# Patient Record
Sex: MALE | Race: WHITE
[De-identification: names, ages, dates, MRNs, and addresses within clinical notes are randomized per-mention and may not be internally consistent; named-entity substitution may affect disease eponyms.]

---

## 2017-04-07 ENCOUNTER — HOSPITAL ENCOUNTER (EMERGENCY)
Dept: HOSPITAL 74 - FER | Age: 27
Discharge: HOME | End: 2017-04-07
Payer: COMMERCIAL

## 2017-04-07 VITALS — DIASTOLIC BLOOD PRESSURE: 78 MMHG | HEART RATE: 82 BPM | TEMPERATURE: 97.5 F | SYSTOLIC BLOOD PRESSURE: 137 MMHG

## 2017-04-07 VITALS — BODY MASS INDEX: 21.2 KG/M2

## 2017-04-07 DIAGNOSIS — I86.1: Primary | ICD-10-CM

## 2017-04-07 DIAGNOSIS — Z87.828: ICD-10-CM

## 2017-04-07 PROCEDURE — 3E0233Z INTRODUCTION OF ANTI-INFLAMMATORY INTO MUSCLE, PERCUTANEOUS APPROACH: ICD-10-PCS
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
## 2017-04-09 ENCOUNTER — HOSPITAL ENCOUNTER (EMERGENCY)
Dept: HOSPITAL 74 - FER | Age: 27
Discharge: HOME | End: 2017-04-09
Payer: COMMERCIAL

## 2017-04-09 VITALS — TEMPERATURE: 98.1 F | DIASTOLIC BLOOD PRESSURE: 81 MMHG | SYSTOLIC BLOOD PRESSURE: 141 MMHG | HEART RATE: 82 BPM

## 2017-04-09 VITALS — BODY MASS INDEX: 21.2 KG/M2

## 2017-04-09 DIAGNOSIS — N50.812: Primary | ICD-10-CM

## 2017-04-09 LAB
COLOR UR: YELLOW
PH UR: 5.5 [PH] (ref 4.5–8)
SP GR UR: 1.01 (ref 1–1.02)
UROBILINOGEN UR STRIP-MCNC: (no result) MG/DL (ref 0.2–1)

## 2017-04-09 NOTE — PDOC
History of Present Illness





- General


History Source: Patient


Exam Limitations: No Limitations





- History of Present Illness


Initial Comments: 





04/09/17 16:18


The patient is a 26 year old male, with no significant past medical history, 

who presents to the emergency department with acute testicular pain. The 

patient was seen in the emergency department 2 days ago for the same symptoms. 

He reports that he had an injury many years ago and had a varicocelectomy in 

November and has never felt the same since. He states that he saw his urologist 

on Friday and he gave him a shot. 





The patient denies discharge,dysuria, fever, chills, abdominal pain, and back 

pain.


He denies any other medical issues.  





Allergies: None


Past surgical history: Varicocelectomy (November 2016)





<Penny Solis - Last Filed: 04/09/17 18:24>





<Michael Lyon - Last Filed: 04/09/17 18:27>





- General


Chief Complaint: Pain


Stated Complaint: TESTICLE PAIN


Time Seen by Provider: 04/09/17 15:44





Past History





<Penny Solis - Last Filed: 04/09/17 18:24>





- Past Medical History


 Disorders: Yes (CHRONIC TESTICULAR PAIN)





- Psycho/Social/Smoking Cessation Hx


Anxiety: No


Suicidal Ideation: No


Smoking History: Never smoked





<Michael Lyon - Last Filed: 04/09/17 18:27>





- Past Medical History


Allergies/Adverse Reactions: 


 Allergies











Allergy/AdvReac Type Severity Reaction Status Date / Time


 


No Known Allergies Allergy   Verified 04/09/17 15:40











Home Medications: 


Ambulatory Orders





Ketorolac Tromethamine [Toradol] 10 mg PO Q6H PRN #20 tablet 04/07/17 


Ciprofloxacin HCl [Cipro] 500 mg PO BID #14 tablet 04/09/17 


Oxycodone HCl/Acetaminophen [Percocet 5-325 mg Tablet] 1 tab PO Q4H #10 tablet 

MDD 6 04/09/17 











**Review of Systems





- Review of Systems


Able to Perform ROS?: Yes


Comments:: 





04/09/17 16:18


Able to Perform ROS?: Yes








CONSTITUTIONAL: Yes: Symptoms Reported, See HPI, Night Sweats.  No: Chills, 

Diaphoresis, Fever, Loss of Appetite, Malaise, Weakness, Weight Stable, 

Unintentional Wgt. Loss, Unexplained wgt Loss, Other








HEENTM: Yes: Symptoms Reported, See HPI.  No: Eye Pain, Blurred Vision, Tearing

, Recent change in vision, Double Vision, Cataracts, Ear Pain, Ocular Prothesis

, Ear Discharge, Nose Pain, Nose Congestion, Tinnitus, Nose Bleeding, Hearing 

Loss, Throat Pain, Throat Swelling, Mouth Pain, Dental Problems, Difficulty 

Swallowing, Mouth Swelling, Other








RESPIRATORY: Yes: Symptoms reported, See HPI.  No: Cough, Orthopnea, Shortness 

of Breath, SOB with Exertion, SOB at Rest, Stridor, Wheezing, Productive cough, 

Hemoptysis, Other








CARDIAC (ROS): Yes: Symptoms Reported, See HPI.  No: Chest Pain, Edema, 

Irregular Heart Rate, Lightheadedness, Palpitations, Syncope, Chest Tightness, 

Other








ABD/GI: Yes: Symptoms Reported, See HPI.  No: Abdominal Distended, Abd. Pain w/ 

defecation, Blood Streaked Bowels, Constipated, Diarrhea, Difficulty Swallowing

, Nausea, Poor Appetite, Poor Fluid Intake, Rectal Bleeding, Vomiting, 

Indigestion, Abdominal cramping, Tarry Stools, Other








: Yes: Symptoms Reported, See HPI








MUSCULOSKELETAL: Yes: Symptoms Reported, See HPI.  No: Back Pain, Gout, Joint 

Pain, Joint Swelling, Muscle Pain, Muscle Weakness, Neck Pain, Joint Stiffness, 

Other








INTEGUMENTARY: Yes: Symptoms Reported, See HPI.  No: Bruising, Change in Color, 

Change in Hair/Nails, Dryness, Erythema, Flushing, Lesions, Lumps, Pallor, 

Pruritus, Rash, Sweating, Other





TESTICULAR: +Scrotal pain. 








NEUROLGOICAL: Yes: Symptoms reported, See HPI.  No: Headache, Numbness, 

Paresthesia, Pre-Existing Deficit, Seizure, Tingling, Tremors, Weakness, 

Unsteady Gait, Ataxia, Dizziness, Other








All Other Systems: Reviewed and Negative








<Penny Solis - Last Filed: 04/09/17 18:24>





*Physical Exam





- Physical Exam


Comments: 


04/09/17 16:19





Physical Exam


GENERAL APPEARANCE: Yes: Appropriately Dressed, Nourished.  No: Apparent 

Distress, Disheveled, Mild Distress, Moderate Distress, Severe Distress, 

Alcohol on Breath, Intoxicated, Cachetic, Obese, Thin, Other








HEENT: positive: EOMI, RUDOLPH, Normal ENT Inspection, Normal Voice, TMs Normal, 

Pharynx Normal.  negative: Symmetrical, Pale Conjunctivae, Photophobia, Scleral 

Icterus (R), Scleral Icterus (L), Muffled/Hoarse voice, Pharyngeal Erythema, 

Tonsillar Exudate, Tonsillar Erythema, Nasal Congestion, Rhinorrhea, Sinus 

Tenderness, Orbits, Hearing Decreased, Hearing Grossly Normal, TM Bulging, TM 

Dull, TM Erythema, Lesions, Marks, Excessive drooling, Thrush, Other








NECK: positive: Trachea midline, Normal Thyroid, Supple.  negative: Tender, 

Rigid, Carotid bruit, Decreased range of motion, Stridor, Lymphadenopathy (R), 

Lymphadenopathy (L), Rigidity, Tender lateral, Tender midline, Thyromegaly, 

Other








RESPIRATORY/CHEST: positive: Lungs Clear, Normal Breath Sounds.  negative: 

Accessory Muscle Use, Chest Tender, Respiratory Distress, Labored Respiration, 

Rapid RR, Decreased Breath Sounds, Paradoxal Breathing, Crackles, Rales, Rhonchi

, Stridor, Wheezing, Dullness, Hyperresonant, Plerual Rub, Other








CARDIOVASCULAR: positive: Regular Rate, Regular Rhythm, S1, S2.  negative: Edema

, JVD, Murmur, Bradycardia, Tachycardia, Diastolic Murmur, Systolic Murmur, 

Gallop/S3, Gallop/S4, Irregularly Irregular, Irregular, Other








VASCULAR PULSES: Femoral (R): 4+, Femoral (L): 4+, Carotid (R): 4+, Carotid (L)

: 4+, Dorsalis-Pedis (R): 4+, Doralis-Pedis (L): 4+


Gastrointestinal/Abdominal: positive: Normal Bowel Sounds, Flat, Soft.  negative

: Tender, Organomegaly, Pulsatile Mass, Increased Bowel Sounds, Decreased BS, 

Protuberent, Distended, Guarding, Rebound, Tenderness, Hernia, Mass, 

Hepatomegaly, Spleenomegaly, Other





TESTICULAR: Normal exam. No testicular torsion. No epididymal pain. No redness, 

no swelling, no tenderness. 








LYMPHATIC: negative: Adenopathy, Tenderness, Other








MUSCULOSKELETAL: positive: Normal Inspection.  negative: CVA Tenderness, CVA 

Tenderness (R), CVA Tenderness (L), Decreased Range of Motion, Muscle Spasm, 

Vertebral Tenderness, Other








EXTREMITY: positive: Normal Capillary Refill, Normal Inspection, Normal Range 

of Motion.  negative: Tender, Pelvis Stable, Coldness, Cyanosis, Delayed 

Capillary Refill, Pedal Edema, Swelling, Calf Tenderness, Erythema, Inflammation

, Other








INTEGUMENTARY: positive: Normal Color, Dry, Warm.  negative: Cyanotic, Erythema

, Jaundice, Mottled, Pale, Cold, Clammy, Diaphoresis, Moist, Hives, Petechiae, 

Rash, Swelling, Ecchymosis, Bruising, Other








NEUROLOGIC: positive: CNs II-XII NML intact, Fully Oriented, Alert, Normal Mood/

Affect, Normal Response, Motor Strength 5/5.  negative: Abnormal Cranial NS, 

Respond to painful stimul, Responsive, EOM Palsy, Facial Droop, Numbness, 

Sensory Deficit, Finger to Nose, Confused, Disoriented, Depressed Affect, 

Babinski, Other














<Penny Solis - Last Filed: 04/09/17 18:24>





ED Treatment Course





- RADIOLOGY


Radiograph Interpretation: 





04/09/17 18:24


 Referring Physician: Michael Lyon 


Patient Name: Gabriele Christian 


DATE OF SERVICE: 2017-04-09 16:35:07.0  IMAGES: 56 


EXAM: SCROTAL ULTRASOUND WITH DOPPLER:  


REASON FOR EXAM: bilateral pain.left >right  


COMPARISON: 4/7/2017  


FINDINGS: The testicles are normal in echotexture with normal and symmetric 

arterial and venous testicular vascularity. There is no solid or cystic 

testicular lesion. Small right hydrocele noted. There is no varicocele. 

Epididymal vascularity symmetric.  The right testicle measures 4.5 x 2.2 x 3 

cm. The epididymus measures 1.3 cm with tiny 2 x 2 x 7 mm cyst  The left 

testicle measures 4.4 x 2.5 x 3.5 cm. The epididymus measures 0.7 cm.  


IMPRESSION: No evidence of testicular torsion, orchitis or epididymitis. Tiny 

right epididymal cyst. 


Traci Whitfield D.O. 04/09/2017 17:44 EST MDANIELLE 








<Penny Solis - Last Filed: 04/09/17 18:24>





Progress Note





- Progress Note


Progress Note: 


Pt is feeling better after pain medication given


Will treat for orchitis 2 days ago and will be given pain medication


Needs to follow up with Urologist





If worsen return to ER


Pt in agreement with plan





<Michael Lyon - Last Filed: 04/09/17 18:27>





*DC/Admit/Observation/Transfer





- Attestations


Scribe Attestion: 





04/09/17 16:19





Documentation prepared by JESUS Vincent, acting as medical scribe for 

Michael Lyon MD.





<Penny Solis - Last Filed: 04/09/17 18:24>





- Discharge Dispostion


Admit: No





<Michael Lyon - Last Filed: 04/09/17 18:27>


Diagnosis at time of Disposition: 


 Pain in left testicle





- Discharge Dispostion


Disposition: HOME


Condition at time of disposition: Good





- Prescriptions


Prescriptions: 


Ciprofloxacin HCl [Cipro] 500 mg PO BID #14 tablet


Oxycodone HCl/Acetaminophen [Percocet 5-325 mg Tablet] 1 tab PO Q4H #10 tablet 

MDD 6





- Patient Instructions


Printed Discharge Instructions:  DI for Testicular Pain


Additional Instructions: 


Continue Toradol


Ice, Tylenol, rest


Cipro 500mg 2x/day for 7 days


Percocet 5/325 mg 1 tab every 4-6 hr for pain


If worsen return to ER





Follow up with your Urologist

## 2017-08-02 ENCOUNTER — OUTPATIENT (OUTPATIENT)
Dept: OUTPATIENT SERVICES | Facility: HOSPITAL | Age: 27
LOS: 1 days | End: 2017-08-02

## 2017-09-17 ENCOUNTER — HOSPITAL ENCOUNTER (EMERGENCY)
Dept: HOSPITAL 74 - FER | Age: 27
Discharge: HOME | End: 2017-09-17
Payer: COMMERCIAL

## 2017-09-17 VITALS — TEMPERATURE: 98.4 F | SYSTOLIC BLOOD PRESSURE: 134 MMHG | HEART RATE: 90 BPM | DIASTOLIC BLOOD PRESSURE: 75 MMHG

## 2017-09-17 VITALS — BODY MASS INDEX: 28.1 KG/M2

## 2017-09-17 DIAGNOSIS — N50.812: Primary | ICD-10-CM

## 2017-09-17 PROCEDURE — 3E0233Z INTRODUCTION OF ANTI-INFLAMMATORY INTO MUSCLE, PERCUTANEOUS APPROACH: ICD-10-PCS
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
## 2017-09-19 ENCOUNTER — HOSPITAL ENCOUNTER (EMERGENCY)
Dept: HOSPITAL 74 - FER | Age: 27
LOS: 1 days | Discharge: HOME | End: 2017-09-20
Payer: COMMERCIAL

## 2017-09-19 VITALS — TEMPERATURE: 99.5 F

## 2017-09-19 VITALS — BODY MASS INDEX: 21.2 KG/M2

## 2017-09-19 DIAGNOSIS — F41.0: ICD-10-CM

## 2017-09-19 DIAGNOSIS — N50.812: Primary | ICD-10-CM

## 2017-09-19 LAB
COLOR UR: YELLOW
LEUKOCYTE ESTERASE UR QL STRIP: NEGATIVE
PH UR: 7.5 [PH] (ref 4.5–8)
SP GR UR: 1.01 (ref 1–1.02)
UROBILINOGEN UR STRIP-MCNC: 0.2 MG/DL (ref 0.2–1)

## 2017-09-19 PROCEDURE — 3E0233Z INTRODUCTION OF ANTI-INFLAMMATORY INTO MUSCLE, PERCUTANEOUS APPROACH: ICD-10-PCS
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
## 2017-09-20 VITALS — DIASTOLIC BLOOD PRESSURE: 70 MMHG | HEART RATE: 95 BPM | SYSTOLIC BLOOD PRESSURE: 120 MMHG

## 2018-02-20 ENCOUNTER — HOSPITAL ENCOUNTER (EMERGENCY)
Dept: HOSPITAL 74 - JER | Age: 28
Discharge: HOME | End: 2018-02-20
Payer: COMMERCIAL

## 2018-02-20 VITALS — BODY MASS INDEX: 21.2 KG/M2

## 2018-02-20 VITALS — TEMPERATURE: 99.9 F

## 2018-02-20 VITALS — HEART RATE: 116 BPM | DIASTOLIC BLOOD PRESSURE: 64 MMHG | SYSTOLIC BLOOD PRESSURE: 124 MMHG

## 2018-02-20 DIAGNOSIS — S60.552A: Primary | ICD-10-CM
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
## 2018-02-20 DIAGNOSIS — S60.551A: ICD-10-CM
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
## 2018-02-20 PROCEDURE — 0JCD0ZZ EXTIRPATION OF MATTER FROM RIGHT UPPER ARM SUBCUTANEOUS TISSUE AND FASCIA, OPEN APPROACH: ICD-10-PCS

## 2018-02-20 PROCEDURE — 0JCF0ZZ EXTIRPATION OF MATTER FROM LEFT UPPER ARM SUBCUTANEOUS TISSUE AND FASCIA, OPEN APPROACH: ICD-10-PCS
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
## 2019-01-09 ENCOUNTER — HOSPITAL ENCOUNTER (EMERGENCY)
Dept: HOSPITAL 74 - FER | Age: 29
LOS: 1 days | Discharge: HOME | End: 2019-01-10
Payer: COMMERCIAL

## 2019-01-09 VITALS — BODY MASS INDEX: 22.8 KG/M2

## 2019-01-09 DIAGNOSIS — G89.29: ICD-10-CM

## 2019-01-09 DIAGNOSIS — N50.811: ICD-10-CM

## 2019-01-09 DIAGNOSIS — N45.1: Primary | ICD-10-CM

## 2019-01-10 VITALS — SYSTOLIC BLOOD PRESSURE: 118 MMHG | HEART RATE: 96 BPM | DIASTOLIC BLOOD PRESSURE: 88 MMHG | TEMPERATURE: 98.3 F

## 2019-01-13 ENCOUNTER — HOSPITAL ENCOUNTER (EMERGENCY)
Dept: HOSPITAL 74 - FER | Age: 29
Discharge: LEFT BEFORE DISCHARGE | End: 2019-01-13
Payer: COMMERCIAL

## 2019-01-13 VITALS — SYSTOLIC BLOOD PRESSURE: 126 MMHG | HEART RATE: 82 BPM | TEMPERATURE: 98.5 F | DIASTOLIC BLOOD PRESSURE: 94 MMHG

## 2019-01-13 VITALS — BODY MASS INDEX: 22.8 KG/M2

## 2019-01-13 DIAGNOSIS — N50.819: Primary | ICD-10-CM

## 2019-01-13 NOTE — PDOC
History of Present Illness





- General


Chief Complaint: Pain, Acute


Stated Complaint: CHRONIC TESTICULAR PAIN


Time Seen by Provider: 01/13/19 19:34


History Source: Patient


Exam Limitations: Other (History was a challenge as the mother refused to leave 

the room and the continually interrupted and provided most of the answers and 

commentary when the patient would answer.)





- History of Present Illness


Initial Comments: 





01/13/19 20:01


This is a 28-year-old male brought in by his mother for evaluation of 

testicular pain.  He has approximately 10 year history of chronic testicular 

pain for which she has seen multiple urologists and says that they haven't been 

able to help him. As per his mother the only thing that helps his pain is 

opioids. Patient was here 3 days again and given a small prescription for 

opioids a total of 6 Percocet. In addition to that he had a full workup 

including urinalysis and ultrasound that did show possible epididymitis for 

which she was also given a short course of ciprofloxacin. Patient was given 

referral to a urologist and as per his mother he does have an appointment for 

Thursday of next week.  Patient said he has also been to a pain management 

person for management of his pain but the pain management person did not help 

him and he has not tried to see anybody else. Patient denies any fevers, chills

, nausea, vomiting, diarrhea. Patient denies any other complaints. Patient said 

that the pain is unchanged times many years now. Patient said he has taken anti-

inflammatories in the past without relief. Throughout the evaluation the patient

's mother continually interrupted and demanded that he be given opioids.  When 

I told her that I would not prescribe opioids for his pain as he had already 

been given opioids and with the current opioid crisis I felt that his complaint 

did not warrant opioid management. I suggested that if he needed opioids they 

should, from a pain specialist or at the very least his urologist. I did say I 

wanted to do a basic workup including a repeat ultrasound to make sure that his 

symptoms had resolved and there was no other reason for his pain. As soon as I 

told him I would not give him opioids his mother became very upset and angry 

and refused any further evaluation told him to get dressed and demanded that he 

leave the emergency department with her.


Patient eloped from the emergency department with his mother.





Patient's mother refused to let me do an exam or any further workup. 





Past History





- Past Medical History


Allergies/Adverse Reactions: 


 Allergies











Allergy/AdvReac Type Severity Reaction Status Date / Time


 


No Known Allergies Allergy   Verified 01/09/19 23:47











Home Medications: 


Ambulatory Orders





Amitriptyline HCl [Elavil -] 10 mg PO HS 02/20/18 


Olanzapine [Zyprexa] 10 mg PO DAILY 02/20/18 


Ciprofloxacin [Cipro -] 500 mg PO Q12H #6 tablet 01/10/19 


Oxycodone HCl/Acetaminophen [Percocet 5-325 mg Tablet] 1 tab PO Q6H PRN #6 

tablet MDD 3 01/10/19 








COPD: No


 Disorders: Yes (CHRONIC TESTICULAR PAIN)


Other medical history: CHRONIC TESTICULAR PAIN





- Immunization History


Immunization Up to Date: Yes





- Suicide/Smoking/Psychosocial Hx


Smoking History: Never smoked


Have you smoked in the past 12 months: No


Information on smoking cessation initiated: No


Hx Alcohol Use: No


Drug/Substance Use Hx: Yes (WEED DAILY)





*Physical Exam





- Vital Signs


 Last Vital Signs











Temp Pulse Resp BP Pulse Ox


 


 98.5 F   82   16   126/94   100 


 


 01/13/19 19:38  01/13/19 19:38  01/13/19 19:38  01/13/19 19:38  01/13/19 19:38














Moderate Sedation





- Procedure Monitoring


Vital Signs: 


Procedure Monitoring Vital Signs











Temperature  98.5 F   01/13/19 19:38


 


Pulse Rate  82   01/13/19 19:38


 


Respiratory Rate  16   01/13/19 19:38


 


Blood Pressure  126/94   01/13/19 19:38


 


O2 Sat by Pulse Oximetry (%)  100   01/13/19 19:38











*DC/Admit/Observation/Transfer


Diagnosis at time of Disposition: 


 Chronic pain in testicle








- Discharge Dispostion


Disposition: ELOPED


Condition at time of disposition: Stable





- Referrals


Referrals: 


Sy Smallwood MD [Primary Care Provider] - 





- Patient Instructions





- Post Discharge Activity

## 2019-01-16 PROBLEM — Z00.00 ENCOUNTER FOR PREVENTIVE HEALTH EXAMINATION: Status: ACTIVE | Noted: 2019-01-16

## 2019-01-17 ENCOUNTER — APPOINTMENT (OUTPATIENT)
Dept: FAMILY MEDICINE | Facility: CLINIC | Age: 29
End: 2019-01-17

## 2019-02-14 ENCOUNTER — HOSPITAL ENCOUNTER (EMERGENCY)
Dept: HOSPITAL 74 - FER | Age: 29
Discharge: HOME | End: 2019-02-14
Payer: COMMERCIAL

## 2019-02-14 VITALS — BODY MASS INDEX: 21.2 KG/M2

## 2019-02-14 VITALS — SYSTOLIC BLOOD PRESSURE: 125 MMHG | DIASTOLIC BLOOD PRESSURE: 73 MMHG | HEART RATE: 99 BPM

## 2019-02-14 VITALS — TEMPERATURE: 97.6 F

## 2019-02-14 DIAGNOSIS — N50.811: Primary | ICD-10-CM

## 2019-02-14 DIAGNOSIS — F41.0: ICD-10-CM

## 2019-02-14 LAB
ALBUMIN SERPL-MCNC: 4.6 G/DL (ref 3.4–5)
ALP SERPL-CCNC: 45 U/L (ref 45–117)
ALT SERPL-CCNC: 13 U/L (ref 13–61)
AMPHET UR-MCNC: NEGATIVE NG/ML
ANION GAP SERPL CALC-SCNC: 12 MMOL/L (ref 8–16)
APPEARANCE UR: (no result)
AST SERPL-CCNC: 18 U/L (ref 15–37)
BARBITURATES UR-MCNC: NEGATIVE NG/ML
BASOPHILS # BLD: 0.6 % (ref 0–2)
BENZODIAZ UR SCN-MCNC: NEGATIVE NG/ML
BILIRUB SERPL-MCNC: 1.1 MG/DL (ref 0.2–1)
BILIRUB UR STRIP.AUTO-MCNC: (no result) MG/DL
BUN SERPL-MCNC: 17 MG/DL (ref 7–18)
CALCIUM SERPL-MCNC: 9.4 MG/DL (ref 8.5–10)
CHLORIDE SERPL-SCNC: 104 MMOL/L (ref 98–107)
CO2 SERPL-SCNC: 22 MMOL/L (ref 21–32)
COCAINE UR-MCNC: NEGATIVE NG/ML
COLOR UR: (no result)
CREAT SERPL-MCNC: 1 MG/DL (ref 0.55–1.3)
DEPRECATED RDW RBC AUTO: 12.8 % (ref 11.9–15.9)
EOSINOPHIL # BLD: 4 % (ref 0–4.5)
GLUCOSE SERPL-MCNC: 135 MG/DL (ref 74–106)
HCT VFR BLD CALC: 43 % (ref 35.4–49)
HGB BLD-MCNC: 14.2 GM/DL (ref 11.7–16.9)
KETONES UR QL STRIP: (no result)
LYMPHOCYTES # BLD: 37.5 % (ref 8–40)
MCH RBC QN AUTO: 27.6 PG (ref 25.7–33.7)
MCHC RBC AUTO-ENTMCNC: 32.9 G/DL (ref 32–35.9)
MCV RBC: 83.8 FL (ref 80–96)
METHADONE UR-MCNC: NEGATIVE NG/ML
MONOCYTES # BLD AUTO: 7.5 % (ref 3.8–10.2)
NEUTROPHILS # BLD: 50.4 % (ref 42.8–82.8)
OPIATES UR QL SCN: NEGATIVE NG/ML
PCP UR QL SCN: NEGATIVE NG/ML
PH UR: 6 [PH] (ref 4.5–8)
PLATELET # BLD AUTO: 218 K/MM3 (ref 134–434)
PMV BLD: 7.9 FL (ref 7.5–11.1)
POTASSIUM SERPLBLD-SCNC: 3.5 MMOL/L (ref 3.5–5.1)
PROT SERPL-MCNC: 7.6 G/DL (ref 6.4–8.2)
RBC # BLD AUTO: 5.13 M/MM3 (ref 4–5.6)
SODIUM SERPL-SCNC: 138 MMOL/L (ref 136–145)
SP GR UR: >= 1.03 (ref 1.01–1.03)
UROBILINOGEN UR STRIP-MCNC: 0.2 MG/DL (ref 0.2–1)
WBC # BLD AUTO: 4.3 K/MM3 (ref 4–10.8)

## 2019-02-14 PROCEDURE — 3E0233Z INTRODUCTION OF ANTI-INFLAMMATORY INTO MUSCLE, PERCUTANEOUS APPROACH: ICD-10-PCS | Performed by: EMERGENCY MEDICINE

## 2019-02-14 PROCEDURE — 3E023NZ INTRODUCTION OF ANALGESICS, HYPNOTICS, SEDATIVES INTO MUSCLE, PERCUTANEOUS APPROACH: ICD-10-PCS | Performed by: EMERGENCY MEDICINE
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
## 2019-02-17 ENCOUNTER — HOSPITAL ENCOUNTER (EMERGENCY)
Dept: HOSPITAL 74 - FER | Age: 29
Discharge: LEFT BEFORE DISCHARGE | End: 2019-02-17
Payer: COMMERCIAL

## 2019-02-17 VITALS — HEART RATE: 88 BPM | SYSTOLIC BLOOD PRESSURE: 105 MMHG | DIASTOLIC BLOOD PRESSURE: 74 MMHG

## 2019-02-17 VITALS — BODY MASS INDEX: 21.2 KG/M2

## 2019-02-17 VITALS — TEMPERATURE: 99.6 F

## 2019-02-17 DIAGNOSIS — R45.851: Primary | ICD-10-CM

## 2019-02-17 DIAGNOSIS — G89.29: ICD-10-CM

## 2019-02-17 DIAGNOSIS — N50.819: ICD-10-CM

## 2019-02-17 LAB
ALBUMIN SERPL-MCNC: 4.9 G/DL (ref 3.4–5)
ALP SERPL-CCNC: 46 U/L (ref 45–117)
ALT SERPL-CCNC: 12 U/L (ref 13–61)
ANION GAP SERPL CALC-SCNC: 12 MMOL/L (ref 8–16)
AST SERPL-CCNC: 18 U/L (ref 15–37)
BASOPHILS # BLD: 0.4 % (ref 0–2)
BILIRUB SERPL-MCNC: 1 MG/DL (ref 0.2–1)
BUN SERPL-MCNC: 14 MG/DL (ref 7–18)
CALCIUM SERPL-MCNC: 10 MG/DL (ref 8.5–10)
CHLORIDE SERPL-SCNC: 103 MMOL/L (ref 98–107)
CO2 SERPL-SCNC: 23 MMOL/L (ref 21–32)
CREAT SERPL-MCNC: 0.9 MG/DL (ref 0.55–1.3)
DEPRECATED RDW RBC AUTO: 13.4 % (ref 11.9–15.9)
EOSINOPHIL # BLD: 0.4 % (ref 0–4.5)
GLUCOSE SERPL-MCNC: 98 MG/DL (ref 74–106)
HCT VFR BLD CALC: 45.3 % (ref 35.4–49)
HGB BLD-MCNC: 14.7 GM/DL (ref 11.7–16.9)
INR BLD: 1.16 (ref 0.82–1.09)
LYMPHOCYTES # BLD: 9.5 % (ref 8–40)
MCH RBC QN AUTO: 27.2 PG (ref 25.7–33.7)
MCHC RBC AUTO-ENTMCNC: 32.5 G/DL (ref 32–35.9)
MCV RBC: 83.7 FL (ref 80–96)
MONOCYTES # BLD AUTO: 4.7 % (ref 3.8–10.2)
NEUTROPHILS # BLD: 85 % (ref 42.8–82.8)
PLATELET # BLD AUTO: 240 K/MM3 (ref 134–434)
PMV BLD: 7.9 FL (ref 7.5–11.1)
POTASSIUM SERPLBLD-SCNC: 4 MMOL/L (ref 3.5–5.1)
PROT SERPL-MCNC: 7.8 G/DL (ref 6.4–8.2)
PT PNL PPP: 12.9 SEC (ref 10.2–13)
RBC # BLD AUTO: 5.42 M/MM3 (ref 4–5.6)
SODIUM SERPL-SCNC: 138 MMOL/L (ref 136–145)
WBC # BLD AUTO: 9.3 K/MM3 (ref 4–10.8)
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
## 2019-02-17 NOTE — PDOC
History of Present Illness





- General


Chief Complaint: Suicidal


Stated Complaint: i am suicidal


Time Seen by Provider: 02/17/19 16:17


History Source: Patient


Exam Limitations: No Limitations





- History of Present Illness


Initial Comments: 








29 yo M w a hx of chronic scrotal pain and anxiety presents to the ER stating 

he is in too much pain and wants to kill himself. He states he experienced 

testicular trauma when he was 18 which required a varicocelectomy. Since his 

surgery he has experienced "relentless pain" which will not subside no matter 

what he takes. He has seen many urologists for this who have said there is no 

medical problem with his testicle. They have referred him to a pain management 

doctor who has given him gabapentin for the pain but the patient reports no 

relief from the gabapentin. Today he states his pain is 10/10 and excruciating 

and he says that he wants to kill himself because of the pain. He has been 

thinking about killing himself for 6 months and at first it was rare for him to 

think this but now the thoughts have become recurrent and are happening every 

day most of the day. He says his plan is to take as many pills of whatever 

medication will kill him so that he does not have to experience this pain on a 

daily basis. He states he has seen a psychiatrist in the past but does not know 

his name and has not been given a psychiatric diagnosis. His mother who was 

with him at bedside reports that he has been throwing things around the home 

and acting very violently lately stating he is going to kill himself. 





- The mother who was originally with him at bedside states he cannot come home 

because she cannot take care of him. He has been repeating Non-Stop that he 

wants to and is going to kill himself by overdosing on pills. 





FAMILY MEDICAL HISTORY: Mom has schizophrenia, dad has a mental illness - 

patient is not sure the diagnosis. 





PCP: Sy Smallwood


PSH: Right Varicocelectomy


Allergies: NKA, NKDA


Social Hx: Smokes marijuana




















Past History





- Past Medical History


Allergies/Adverse Reactions: 


 Allergies











Allergy/AdvReac Type Severity Reaction Status Date / Time


 


No Known Allergies Allergy   Verified 02/14/19 09:00











Home Medications: 


Ambulatory Orders





Gabapentin 300 mg PO TID 02/14/19 








COPD: No


 Disorders: Yes (CHRONIC TESTICULAR PAIN)





- Suicide/Smoking/Psychosocial Hx


Smoking History: Never smoked


Have you smoked in the past 12 months: No


Hx Alcohol Use: No


Drug/Substance Use Hx: Yes (weed)


Substance Use Type: None





**Review of Systems





- Review of Systems


Able to Perform ROS?: Yes


Comments:: 





CONSTITUTIONAL: 


Absent: fever, chills, diaphoresis, generalized weakness, malaise, loss of 

appetite


HEENT: 


Absent: rhinorrhea, nasal congestion, throat pain, throat swelling, difficulty 

swallowing, mouth swelling, ear pain, eye pain, visual Changes


CARDIOVASCULAR: 


Absent: chest pain, syncope, palpitations, irregular heart rate, lightheadedness

, peripheral edema


RESPIRATORY: 


Absent: cough, shortness of breath, dyspnea with exertion, orthopnea, wheezing, 

stridor, hemoptysis


GASTROINTESTINAL:


Absent: abdominal pain, abdominal distension, nausea, vomiting, diarrhea, 

constipation, melena, hematochezia


GENITOURINARY: 


Present: Genital pain


Absent: dysuria, frequency, urgency, hesitancy, hematuria, flank pain. 


MUSCULOSKELETAL: 


Absent: myalgia, arthralgia, joint swelling


SKIN: 


Absent: rash, itching, pallor


HEMATOLOGIC/IMMUNOLOGIC: 


Absent: easy bleeding, easy bruising, lymphadenopathy, frequent infections


ENDOCRINE:


Absent: unexplained weight gain, unexplained weight loss, heat intolerance, 

cold intolerance


NEUROLOGIC: 


Absent: headache, focal weakness or paresthesias, dizziness, unsteady gait, 

seizure, mental status changes, bladder or bowel incontinence


PSYCHIATRIC: 


Present: Anxiety, depression, suicidal ideation. 


Absent: homicidal ideation, hallucinations.











*Physical Exam





- Physical Exam


Comments: 








RIGHT TESTICLE: The testicle is TTP. There is no erythema, no swelling, and no 

discoloration. 





GENERAL:


Well developed, well nourished. Awake and alert. Moderate distress.


HEENT:


Normocephalic, atraumatic. PERRLA, EOMI. No conjunctival pallor. Sclera are non-

icteric. Moist mucous membranes. Oropharynx is clear.


NECK: 


Supple. Full ROM. No JVD. Carotid pulses 2+ and symmetric, without bruits. No 

thyromegaly. No lymphadenopathy.


CARDIOVASCULAR:


Regular rate and rhythm. No murmurs, rubs, or gallops. Distal pulses are 2+ and 

symmetric. 


PULMONARY: 


No evidence of respiratory distress. Lungs clear to auscultation bilaterally. 

No wheezing, rales or rhonchi.


ABDOMINAL:


Soft. Non-tender. Non-distended. No rebound or guarding. No organomegaly. 

Normoactive bowel sounds. 


MUSCULOSKELETAL 


Normal range of motion at all joints. No bony deformities or tenderness. No CVA 

tenderness.


EXTREMITIES: 


No cyanosis. No clubbing. No edema. No calf tenderness.


SKIN: 


Warm and dry. Normal capillary refill. No rashes. No jaundice. 


NEUROLOGICAL: 


Alert, awake, appropriate. Cranial nerves 2-12 intact. No deficits to light 

touch and temperature in face, upper extremities and lower extremities. No 

motor deficits in the in face, upper extremities and lower extremities. 

Normoreflexic in the upper and lower extremities. Normal speech. Toes are down-

going bilaterally. Gait is normal without ataxia.


PSYCHIATRIC: 


Patient is cooperative yet actively suicidal stating he wants to take pills to 

kill himself. He appears anxious and is pacing back and forth. 











ED Treatment Course





- LABORATORY


CBC & Chemistry Diagram: 


 02/17/19 18:03





 02/17/19 18:03





Medical Decision Making





- Medical Decision Making








29 yo M w a hx of chronic scrotal pain and anxiety presents to the ER stating 

he is in too much pain and wants to kill himself. He states he experienced 

testicular trauma when he was 18 which required a varicocelectomy. Since his 

surgery he has experienced "relentless pain" which will not subside no matter 

what he takes. He has seen many urologists for this who have said there is no 

medical problem with his testicle. They have referred him to a pain management 

doctor who has given him gabapentin for the pain but the patient reports no 

relief from the gabapentin. Today he states his pain is 10/10 and excruciating 

and he says that he wants to kill himself because of the pain. He has been 

thinking about killing himself for 6 months and at first it was rare for him to 

think this but now the thoughts have become recurrent and are happening every 

day most of the day. He says his plan is to take as many pills of whatever 

medication will kill him so that he does not have to experience this pain on a 

daily basis. He states he has seen a psychiatrist in the past but does not know 

his name and has not been given a psychiatric diagnosis. His mother who was 

with him at bedside reports that he has been throwing things around the home 

and acting very violently lately stating he is going to kill himself. 





- The mother who was originally with him at bedside states he cannot come home 

because she cannot take care of him. He has been repeating Non-Stop that he 

wants to and is going to kill himself by overdosing on pills. 





FAMILY MEDICAL HISTORY: Mom has schizophrenia, dad has a mental illness - 

patient is not sure the diagnosis. 





VS: WNL, rectal temp not elevated. 





DDx IBNLT: Psychosis, bipolar disorder, suicide attempt, schizophrenia, 

Borderline personality disorder, testicular abnormality, overdose, withdrawl. 





Plan: 1:1 observation, Labs, urine, u-tox, EKG, Psyche consult, obtain medical 

clearance, transfer to [place]. 





We are not going to perform the Scrotal US because he has had multiple recent 

testicular US's which have all showed no significant pathology. 





Psyche consult to Dr. Trevino - [phone] - paged at 4:15 pm. No answer so 

left a voicemail. 


- Dr. Trevino called back at 5 pm stating there will not be any beds available 

in a psyche facility over the weekend so he will come tomorrow morning to 

evaluate the patient. 





We will run tests on the patient to medically clear him so he is stable for 

transfer to [place] to a psychiatric facility. 





Patient has been informed that he will be observed in the ED overnight so we 

can stabilize him medically and then in the morning Dr. Trevino the 

psychiatrist will come and evaluate him so he can be transfered to a 

psychiatric hospital. 





Patient ran out of the ER in his hospital gown. I chased after him while he was 

leaving the hospital desperately trying to convince him to come back to the 

hospital. He refused and repeatedly gave me the middle finger. 911 was called 

by multiple people. They are aware of the situation and said they will capture 

the patient. 


Patient will be brought to [place] after the police capture him as they 

have a psychiatric facility, and we do not have the proper manpower or security 

to watch over him here. 











*DC/Admit/Observation/Transfer


Diagnosis at time of Disposition: 


 Suicidal ideation








- Discharge Dispostion


Disposition: ELOPED


Condition at time of disposition: Guarded


Decision to Admit order: No





- Referrals


Referrals: 


Tommy Trevino MD [Staff Physician] - 





- Patient Instructions


Printed Discharge Instructions:  DI for Suicidal Ideation-Adult





- Post Discharge Activity


Forms/Work/School Notes:  My Personal Safety Plan

## 2019-02-18 NOTE — EKG
Test Reason : 

Blood Pressure : ***/*** mmHG

Vent. Rate : 084 BPM     Atrial Rate : 084 BPM

   P-R Int : 162 ms          QRS Dur : 090 ms

    QT Int : 348 ms       P-R-T Axes : 059 085 066 degrees

   QTc Int : 411 ms

 

NORMAL SINUS RHYTHM

NORMAL ECG

NO PREVIOUS ECGS AVAILABLE

Confirmed by RUPINDER RICKS MD ([id number]) on 2/18/2019 10:38:04 AM

 

Referred By:  ANA LILIA           Confirmed By:RUPINDER RICKS MD

## 2019-02-19 ENCOUNTER — HOSPITAL ENCOUNTER (EMERGENCY)
Dept: HOSPITAL 74 - JER | Age: 29
Discharge: HOME | End: 2019-02-19
Payer: COMMERCIAL

## 2019-02-19 VITALS — TEMPERATURE: 98.9 F | SYSTOLIC BLOOD PRESSURE: 110 MMHG | HEART RATE: 93 BPM | DIASTOLIC BLOOD PRESSURE: 81 MMHG

## 2019-02-19 VITALS — BODY MASS INDEX: 22.8 KG/M2

## 2019-02-19 DIAGNOSIS — G89.29: ICD-10-CM

## 2019-02-19 DIAGNOSIS — N50.811: Primary | ICD-10-CM

## 2019-02-19 LAB
ALBUMIN SERPL-MCNC: 5 G/DL (ref 3.4–5)
ALP SERPL-CCNC: 53 U/L (ref 45–117)
ALT SERPL-CCNC: 15 U/L (ref 13–61)
AMPHET UR-MCNC: NEGATIVE NG/ML
ANION GAP SERPL CALC-SCNC: 7 MMOL/L (ref 8–16)
APPEARANCE UR: CLEAR
AST SERPL-CCNC: 13 U/L (ref 15–37)
BARBITURATES UR-MCNC: NEGATIVE NG/ML
BASOPHILS # BLD: 0.3 % (ref 0–2)
BENZODIAZ UR SCN-MCNC: NEGATIVE NG/ML
BILIRUB SERPL-MCNC: 0.5 MG/DL (ref 0.2–1)
BILIRUB UR STRIP.AUTO-MCNC: NEGATIVE MG/DL
BUN SERPL-MCNC: 17 MG/DL (ref 7–18)
CALCIUM SERPL-MCNC: 9.8 MG/DL (ref 8.5–10.1)
CHLORIDE SERPL-SCNC: 106 MMOL/L (ref 98–107)
CO2 SERPL-SCNC: 28 MMOL/L (ref 21–32)
COCAINE UR-MCNC: NEGATIVE NG/ML
COLOR UR: (no result)
CREAT SERPL-MCNC: 1.1 MG/DL (ref 0.55–1.3)
DEPRECATED RDW RBC AUTO: 13.9 % (ref 11.9–15.9)
EOSINOPHIL # BLD: 0.1 % (ref 0–4.5)
GLUCOSE SERPL-MCNC: 108 MG/DL (ref 74–106)
HCT VFR BLD CALC: 42.2 % (ref 35.4–49)
HGB BLD-MCNC: 15.2 GM/DL (ref 11.7–16.9)
KETONES UR QL STRIP: (no result)
LEUKOCYTE ESTERASE UR QL STRIP.AUTO: NEGATIVE
LYMPHOCYTES # BLD: 8.1 % (ref 8–40)
MCH RBC QN AUTO: 29.4 PG (ref 25.7–33.7)
MCHC RBC AUTO-ENTMCNC: 35.9 G/DL (ref 32–35.9)
MCV RBC: 81.8 FL (ref 80–96)
METHADONE UR-MCNC: NEGATIVE NG/ML
MONOCYTES # BLD AUTO: 4.2 % (ref 3.8–10.2)
MUCOUS THREADS URNS QL MICRO: (no result)
NEUTROPHILS # BLD: 87.3 % (ref 42.8–82.8)
NITRITE UR QL STRIP: NEGATIVE
OPIATES UR QL SCN: NEGATIVE NG/ML
PCP UR QL SCN: NEGATIVE NG/ML
PH UR: 6 [PH] (ref 5–8)
PLATELET # BLD AUTO: 239 K/MM3 (ref 134–434)
PMV BLD: 7.6 FL (ref 7.5–11.1)
POTASSIUM SERPLBLD-SCNC: 4 MMOL/L (ref 3.5–5.1)
PROT SERPL-MCNC: 8.2 G/DL (ref 6.4–8.2)
PROT UR QL STRIP: (no result)
PROT UR QL STRIP: NEGATIVE
RBC # BLD AUTO: 5.16 M/MM3 (ref 4–5.6)
SODIUM SERPL-SCNC: 141 MMOL/L (ref 136–145)
SP GR UR: 1.02 (ref 1.01–1.03)
UROBILINOGEN UR STRIP-MCNC: NEGATIVE MG/DL (ref 0.2–1)
WBC # BLD AUTO: 10.8 K/MM3 (ref 4–10)

## 2019-02-19 PROCEDURE — 3E033GC INTRODUCTION OF OTHER THERAPEUTIC SUBSTANCE INTO PERIPHERAL VEIN, PERCUTANEOUS APPROACH: ICD-10-PCS

## 2019-02-19 PROCEDURE — 3E0233Z INTRODUCTION OF ANTI-INFLAMMATORY INTO MUSCLE, PERCUTANEOUS APPROACH: ICD-10-PCS

## 2019-02-19 NOTE — PDOC
History of Present Illness





- General


Chief Complaint: Pain


Stated Complaint: PAIN


Time Seen by Provider: 02/19/19 16:50





- History of Present Illness


Initial Comments: 





02/19/19 18:51


28 year old man with a history of chronic R testicular pain who presents with 10

/10 pain in the R testicle since this AM. The patient normally uses marijuana 

for pain management and smoked some this morning as well as used ativan and 

advil. The patient has been seen in this ED multiple times for the same 

complaint and has had approx 7 scrotal ultrasounds without significant 

findings. He has a urologist whom he follows with. While in the ED he had 1x 

emesis 





02/19/19 18:53








Past History





- Past Medical History


Allergies/Adverse Reactions: 


 Allergies











Allergy/AdvReac Type Severity Reaction Status Date / Time


 


No Known Allergies Allergy   Verified 02/19/19 16:50











Home Medications: 


Ambulatory Orders





Gabapentin 300 mg PO TID 02/14/19 








COPD: No


 Disorders: Yes (CHRONIC TESTICULAR PAIN)





- Suicide/Smoking/Psychosocial Hx


Smoking History: Never smoked


Have you smoked in the past 12 months: No


Information on smoking cessation initiated: No


Hx Alcohol Use: No


Drug/Substance Use Hx: No


Substance Use Type: None





*Physical Exam





- Vital Signs


 Last Vital Signs











Temp Pulse Resp BP Pulse Ox


 


 100.4 F H  101 H  16   134/81   100 


 


 02/19/19 16:25  02/19/19 16:25  02/19/19 16:25  02/19/19 16:25  02/19/19 16:25














Moderate Sedation





- Procedure Monitoring


Vital Signs: 


Procedure Monitoring Vital Signs











Temperature  100.4 F H  02/19/19 16:25


 


Pulse Rate  101 H  02/19/19 16:25


 


Respiratory Rate  16   02/19/19 16:25


 


Blood Pressure  134/81   02/19/19 16:25


 


O2 Sat by Pulse Oximetry (%)  100   02/19/19 16:25











ED Treatment Course





- LABORATORY


CBC & Chemistry Diagram: 


 02/19/19 18:27





 02/19/19 18:27





- RADIOLOGY


Radiology Studies Ordered: 














 Category Date Time Status


 


 SCROTUM AND CONTENTS US [US] Stat Ultrasound  02/19/19 16:52 Ordered














*DC/Admit/Observation/Transfer





- Referrals


Referrals: 


Sy Smallwood MD [Primary Care Provider] - 





- Patient Instructions





- Post Discharge Activity

## 2019-02-19 NOTE — PDOC
Attending Attestation





- HPI


HPI: 








02/19/19 18:46


The patient is a 28 year old male with a significant past medical history of 

chronic testicular pain who presents to the emergency department with 

testicular pain. The patient was seen in the ED for similar pain 5 days ago by 

which he got a negative workup for STDs with a negative US read. The patient 

has been seen on different occasions for similar complaint. He states the he 

has and MRI appointment tomorrow. He denies any other symptoms or complaints. 








Documentation prepared by Esperanza Last, acting as medical scribe for Freda Burns MD.








<Esperanza Last - Last Filed: 02/19/19 18:46>





- Resident


Resident Name: Heather Llanes





- ED Attending Attestation


I have performed the following: I have examined & evaluated the patient, The 

case was reviewed & discussed with the resident, I agree w/resident's findings 

& plan, Exceptions are as noted





- HPI


HPI: 





02/19/19 16:59


29 yo male p/w chronic testicular pain since having a varicocelectomy  as a 

teenager. He was seen on 2/14/19 for the same complaint and had  negative work 

up  for STD


02/19/19 18:23





02/19/19 19:54








- Physicial Exam


PE: 





02/19/19 19:07


thin anxious 29 yo male p/w chronic testicular pain


He has had multiple imaging studies and several negative work ups. He has seen 

urologist s for this problem also


-he  has a negative STD work up this month


-his primary physician  is sending him for an MRI tomorrow and he has an appt 

with a pain management specialist 


02/19/19 19:50


thin ,anxious 29 yo male 


head ncat


neck supple


lungs cta b/l


cvs [id number]


abd no guarding   no rebound


   no testicular swelling ,no erythema


ext no edema,no clubbing, no caynosis


skin warm and dry


neuro axox3,ambulatory


psych anxious





- Medical Decision Making





02/19/19 19:56


today's  testicular US is negative for any mass, negative testicular torsion


02/19/19 20:27


pt to keep prior scheduled appt for further imaging and pain specialist





<Freda Burns - Last Filed: 02/19/19 20:27>

## 2019-06-02 NOTE — PDOC
History of Present Illness





- General


Chief Complaint: Pain, Acute


Stated Complaint: TESTICULAR PAIN FOR YEARS


Time Seen by Provider: 01/09/19 23:50





- History of Present Illness


Initial Comments: 





This 28-year-old man with a history of chronic scrotal pain presents with 1 day 

history worsening of his usual pain; patient states that his usual level of 

pain is 7/10, which he generally tolerates (occasionally smoking marijuana for 

pain relief).  Today, pain is 10/10.  He denies new trauma to the area; no 

recent fever or scrotal swelling.  He denies dysuria, hematuria or penile 

discharge.  


Pain began approximately 10 years ago when he sustained direct trauma to the 

scrotum (he was "kneed").  He had removal of a varicocele on the left side 

approximately 2 years ago; he states that pain became worse after this 

procedure.








Past History





- Past Medical History


Allergies/Adverse Reactions: 


 Allergies











Allergy/AdvReac Type Severity Reaction Status Date / Time


 


No Known Allergies Allergy   Verified 01/09/19 23:47











Home Medications: 


Ambulatory Orders





Amitriptyline HCl [Elavil -] 10 mg PO HS 02/20/18 


Olanzapine [Zyprexa] 10 mg PO DAILY 02/20/18 


Ciprofloxacin [Cipro -] 500 mg PO Q12H #6 tablet 01/10/19 


Oxycodone HCl/Acetaminophen [Percocet 5-325 mg Tablet] 1 tab PO Q6H PRN #6 

tablet MDD 3 01/10/19 








COPD: No





- Immunization History


Immunization Up to Date: Yes





- Suicide/Smoking/Psychosocial Hx


Smoking History: Never smoked


Drug/Substance Use Hx: No





**Review of Systems





- Review of Systems


Able to Perform ROS?: Yes


Comments:: 


12 point review of systems is negative except for what is noted in the history 

of present illness











*Physical Exam





- Physical Exam


Comments: 





GENERAL: Adult, alert and oriented 3 in no acute distress


HEAD: Normal with no signs of trauma.


EYES: PERRLA, EOMI, sclera anicteric, conjunctiva clear.


ENT: Ears normal, nares patent, oropharynx clear without exudates.  Moist 

mucous membranes.


NECK: Normal range of motion, supple without lymphadenopathy, JVD, or masses.


LUNGS: Breath sounds equal, clear to auscultation bilaterally.  No wheezes, and 

no crackles.


HEART:Regular rate and rhythm, normal S1 and S2 without murmur, rub or gallop.


ABDOMEN:.normal bowel sounds  No guarding,tenderness or rebound.No masses No 

distention.


            SCROTAL: Mild tenderness/mild edema right epididymis ; no masses 

palpable


EXTREMITIES: Normal range of motion, no edema.  No clubbing or cyanosis. No 

erythema, or tenderness.


NEUROLOGICAL: Cranial nerves II through XII grossly intact.  Normal speech.  No 

focal 


neurological deficits. 


MUSCULOSKELETAL:  Back non-tender to palpation, no CVA tenderness


SKIN: Warm, Dry, normal turgor, no rashes or lesions noted.








Progress Note





- Progress Note


Progress Note: 





Scrotal ultrasound was performed.  Preliminary reading by Imaging on Call: 

Slight increased vascularity of the right testicle and epididymis relative to 

the left possibly right epididymoorchitis.  No other acute findings





Patient's acute flareup of pain in the right scrotum may be related to 

epididymitis.  Patient denies any sexual activity in recent time; no penile 

discharge noted.  Patient denies previous STD.  The patient will be treated 

with Cipro 500 mg twice a day.  He asked for and will be given a small (#6) 

prescription for Percocet 5/325 to be used as needed for severe pain.





Patient will be given  referral information for urologic follow-up.





*DC/Admit/Observation/Transfer


Diagnosis at time of Disposition: 


 Epididymitis, Chronic pain in testicle








- Discharge Dispostion


Disposition: HOME


Condition at time of disposition: Stable





- Prescriptions


Prescriptions: 


Ciprofloxacin [Cipro -] 500 mg PO Q12H #6 tablet


Oxycodone HCl/Acetaminophen [Percocet 5-325 mg Tablet] 1 tab PO Q6H PRN #6 

tablet MDD 3


 PRN Reason: Severe Pain





- Referrals


Referrals: 


Jaylen Robles MD [Staff Physician] - Call tomorrow





- Patient Instructions


Printed Discharge Instructions:  DI for Epididymitis


Additional Instructions: 


cipro 500mg twice a day for 3 days


Drink plenty of water


Tylenol/Motrin as needed for mild-to-moderate pain


Percocet 5/325 up to 3 times a day as needed for severe pain


Follow-up with  within the next 5 days; call office tomorrow to 

arrange follow-up





- Post Discharge Activity
no